# Patient Record
Sex: MALE | Race: OTHER | HISPANIC OR LATINO | Employment: FULL TIME | URBAN - METROPOLITAN AREA
[De-identification: names, ages, dates, MRNs, and addresses within clinical notes are randomized per-mention and may not be internally consistent; named-entity substitution may affect disease eponyms.]

---

## 2020-08-25 ENCOUNTER — APPOINTMENT (EMERGENCY)
Dept: RADIOLOGY | Facility: HOSPITAL | Age: 40
End: 2020-08-25
Payer: COMMERCIAL

## 2020-08-25 ENCOUNTER — HOSPITAL ENCOUNTER (EMERGENCY)
Facility: HOSPITAL | Age: 40
Discharge: HOME | End: 2020-08-26
Attending: EMERGENCY MEDICINE | Admitting: EMERGENCY MEDICINE
Payer: COMMERCIAL

## 2020-08-25 DIAGNOSIS — R07.89 ATYPICAL CHEST PAIN: Primary | ICD-10-CM

## 2020-08-25 LAB
BASOPHILS # BLD AUTO: 0 THOUSANDS/ΜL (ref 0–0.1)
BASOPHILS NFR BLD AUTO: 1 % (ref 0–1)
EOSINOPHIL # BLD AUTO: 0.2 THOUSAND/ΜL (ref 0–0.4)
EOSINOPHIL NFR BLD AUTO: 3 % (ref 0–6)
ERYTHROCYTE [DISTWIDTH] IN BLOOD BY AUTOMATED COUNT: 13.5 %
HCT VFR BLD AUTO: 43.8 % (ref 41–53)
HGB BLD-MCNC: 15.2 G/DL (ref 13.5–17.5)
LYMPHOCYTES # BLD AUTO: 1.9 THOUSANDS/ΜL (ref 0.5–4)
LYMPHOCYTES NFR BLD AUTO: 27 % (ref 25–45)
MCH RBC QN AUTO: 34.1 PG (ref 26–34)
MCHC RBC AUTO-ENTMCNC: 34.8 G/DL (ref 31–36)
MCV RBC AUTO: 98 FL (ref 80–100)
MONOCYTES # BLD AUTO: 0.7 THOUSAND/ΜL (ref 0.2–0.9)
MONOCYTES NFR BLD AUTO: 11 % (ref 1–10)
NEUTROPHILS # BLD AUTO: 4 THOUSANDS/ΜL (ref 1.8–7.8)
NEUTS SEG NFR BLD AUTO: 58 % (ref 45–65)
PLATELET # BLD AUTO: 216 THOUSANDS/UL (ref 150–450)
PMV BLD AUTO: 8.9 FL (ref 8.9–12.7)
RBC # BLD AUTO: 4.47 MILLION/UL (ref 4.5–5.9)
WBC # BLD AUTO: 6.8 THOUSAND/UL (ref 4.5–11)

## 2020-08-25 PROCEDURE — 96374 THER/PROPH/DIAG INJ IV PUSH: CPT

## 2020-08-25 PROCEDURE — 71045 X-RAY EXAM CHEST 1 VIEW: CPT

## 2020-08-25 PROCEDURE — 84484 ASSAY OF TROPONIN QUANT: CPT | Performed by: PHYSICIAN ASSISTANT

## 2020-08-25 PROCEDURE — 96361 HYDRATE IV INFUSION ADD-ON: CPT

## 2020-08-25 PROCEDURE — 80053 COMPREHEN METABOLIC PANEL: CPT | Performed by: PHYSICIAN ASSISTANT

## 2020-08-25 PROCEDURE — 99285 EMERGENCY DEPT VISIT HI MDM: CPT

## 2020-08-25 PROCEDURE — 93005 ELECTROCARDIOGRAM TRACING: CPT

## 2020-08-25 PROCEDURE — 36415 COLL VENOUS BLD VENIPUNCTURE: CPT | Performed by: PHYSICIAN ASSISTANT

## 2020-08-25 PROCEDURE — 99284 EMERGENCY DEPT VISIT MOD MDM: CPT | Performed by: PHYSICIAN ASSISTANT

## 2020-08-25 PROCEDURE — 85025 COMPLETE CBC W/AUTO DIFF WBC: CPT | Performed by: PHYSICIAN ASSISTANT

## 2020-08-25 PROCEDURE — 83690 ASSAY OF LIPASE: CPT | Performed by: PHYSICIAN ASSISTANT

## 2020-08-25 RX ORDER — KETOROLAC TROMETHAMINE 30 MG/ML
15 INJECTION, SOLUTION INTRAMUSCULAR; INTRAVENOUS ONCE
Status: DISCONTINUED | OUTPATIENT
Start: 2020-08-25 | End: 2020-08-25

## 2020-08-25 RX ORDER — KETOROLAC TROMETHAMINE 30 MG/ML
15 INJECTION, SOLUTION INTRAMUSCULAR; INTRAVENOUS ONCE
Status: COMPLETED | OUTPATIENT
Start: 2020-08-26 | End: 2020-08-25

## 2020-08-25 RX ADMIN — SODIUM CHLORIDE 1000 ML: 0.9 INJECTION, SOLUTION INTRAVENOUS at 23:45

## 2020-08-25 RX ADMIN — KETOROLAC TROMETHAMINE 15 MG: 30 INJECTION, SOLUTION INTRAMUSCULAR at 23:50

## 2020-08-26 VITALS
DIASTOLIC BLOOD PRESSURE: 64 MMHG | SYSTOLIC BLOOD PRESSURE: 112 MMHG | TEMPERATURE: 97.2 F | WEIGHT: 159.25 LBS | RESPIRATION RATE: 15 BRPM | HEART RATE: 61 BPM | OXYGEN SATURATION: 96 %

## 2020-08-26 LAB
ALBUMIN SERPL BCP-MCNC: 4.1 G/DL (ref 3–5.2)
ALP SERPL-CCNC: 53 U/L (ref 43–122)
ALT SERPL W P-5'-P-CCNC: 87 U/L (ref 9–52)
ANION GAP SERPL CALCULATED.3IONS-SCNC: 5 MMOL/L (ref 5–14)
AST SERPL W P-5'-P-CCNC: 74 U/L (ref 17–59)
ATRIAL RATE: 75 BPM
BILIRUB SERPL-MCNC: 0.3 MG/DL
BUN SERPL-MCNC: 11 MG/DL (ref 5–25)
CALCIUM SERPL-MCNC: 9.3 MG/DL (ref 8.4–10.2)
CHLORIDE SERPL-SCNC: 105 MMOL/L (ref 97–108)
CO2 SERPL-SCNC: 26 MMOL/L (ref 22–30)
CREAT SERPL-MCNC: 0.81 MG/DL (ref 0.7–1.5)
GFR SERPL CREATININE-BSD FRML MDRD: 110 ML/MIN/1.73SQ M
GLUCOSE SERPL-MCNC: 119 MG/DL (ref 70–99)
LIPASE SERPL-CCNC: 119 U/L (ref 23–300)
P AXIS: 29 DEGREES
POTASSIUM SERPL-SCNC: 3.5 MMOL/L (ref 3.6–5)
PR INTERVAL: 164 MS
PROT SERPL-MCNC: 7 G/DL (ref 5.9–8.4)
QRS AXIS: -43 DEGREES
QRSD INTERVAL: 96 MS
QT INTERVAL: 368 MS
QTC INTERVAL: 410 MS
SODIUM SERPL-SCNC: 136 MMOL/L (ref 137–147)
T WAVE AXIS: 23 DEGREES
TROPONIN I SERPL-MCNC: <0.01 NG/ML (ref 0–0.03)
TROPONIN I SERPL-MCNC: <0.01 NG/ML (ref 0–0.03)
VENTRICULAR RATE: 75 BPM

## 2020-08-26 PROCEDURE — 93010 ELECTROCARDIOGRAM REPORT: CPT | Performed by: INTERNAL MEDICINE

## 2020-08-26 PROCEDURE — 84484 ASSAY OF TROPONIN QUANT: CPT | Performed by: PHYSICIAN ASSISTANT

## 2020-08-26 PROCEDURE — 36415 COLL VENOUS BLD VENIPUNCTURE: CPT | Performed by: PHYSICIAN ASSISTANT

## 2020-08-26 NOTE — ED ATTENDING ATTESTATION
8/25/2020  I, [address] [facility], saw and evaluated the patient  I have discussed the patient with the resident/non-physician practitioner and agree with the resident's/non-physician practitioner's findings, Plan of Care, and MDM as documented in the resident's/non-physician practitioner's note, except where noted  All available labs and Radiology studies were reviewed  I was present for key portions of any procedure(s) performed by the resident/non-physician practitioner and I was immediately available to provide assistance  At this point I agree with the current assessment done in the Emergency Department         ED Course         Critical Care Time  Procedures

## 2020-08-26 NOTE — ED PROVIDER NOTES
History  Chief Complaint   Patient presents with    Chest Pain     Chest pain started 15 minutes prior to arrival, was watching television when it started  Patient presents for evaluation of a 15 minutes history of substernal and left-sided chest pain  Patient states he was sitting on his couch watching TV when pain suddenly started  Denies any medical history nor any family cardiac history  Does not take any medications regularly  Denies abdominal pain but does report some nausea  Denies history of similar occurrences in the past   Denies any numbness or tingling  Denies any shortness of breath  Denies any cough, fever, chills  Denies any tobacco use  No other symptoms or complaints  None       History reviewed  No pertinent past medical history  History reviewed  No pertinent surgical history  History reviewed  No pertinent family history  I have reviewed and agree with the history as documented  E-Cigarette/Vaping    E-Cigarette Use Never User      E-Cigarette/Vaping Substances     Social History     Tobacco Use    Smoking status: Light Tobacco Smoker    Smokeless tobacco: Never Used    Tobacco comment: socially   Substance Use Topics    Alcohol use: Yes     Frequency: 4 or more times a week     Drinks per session: 7 to 9     Binge frequency: Daily or almost daily    Drug use: Yes     Types: Marijuana       Review of Systems   Constitutional: Negative for chills and fever  HENT: Negative for congestion, ear pain and sore throat  Eyes: Negative for pain  Respiratory: Negative for cough and shortness of breath  Cardiovascular: Positive for chest pain  Gastrointestinal: Positive for nausea  Negative for abdominal pain and vomiting  Genitourinary: Negative for dysuria  Musculoskeletal: Negative for back pain  Skin: Negative for rash  Neurological: Negative for dizziness, weakness and numbness  Psychiatric/Behavioral: Negative for suicidal ideas     All other systems reviewed and are negative  Physical Exam  Physical Exam  Vitals signs reviewed  Constitutional:       General: He is not in acute distress  Appearance: He is well-developed  He is not diaphoretic  HENT:      Head: Normocephalic and atraumatic  Right Ear: External ear normal       Left Ear: External ear normal       Nose: Nose normal    Eyes:      Pupils: Pupils are equal, round, and reactive to light  Neck:      Musculoskeletal: Normal range of motion and neck supple  Cardiovascular:      Rate and Rhythm: Normal rate and regular rhythm  Heart sounds: Normal heart sounds  Heart sounds not distant  No murmur  Pulmonary:      Effort: Pulmonary effort is normal       Breath sounds: Normal breath sounds  No decreased breath sounds, wheezing, rhonchi or rales  Chest:      Chest wall: No tenderness or crepitus  Abdominal:      General: Bowel sounds are normal       Palpations: Abdomen is soft  Tenderness: There is no abdominal tenderness  Musculoskeletal: Normal range of motion  Skin:     General: Skin is warm and dry  Neurological:      Mental Status: He is alert and oriented to person, place, and time           Vital Signs  ED Triage Vitals [08/25/20 2312]   Temperature Pulse Respirations Blood Pressure SpO2   (!) 97 2 °F (36 2 °C) 95 20 133/83 97 %      Temp Source Heart Rate Source Patient Position - Orthostatic VS BP Location FiO2 (%)   Tympanic Monitor Lying Left arm --      Pain Score       8           Vitals:    08/26/20 0100 08/26/20 0130 08/26/20 0241 08/26/20 0300   BP: 119/72 119/73 116/76 120/73   Pulse: 69 67 58 59   Patient Position - Orthostatic VS:    Sitting         Visual Acuity      ED Medications  Medications   sodium chloride 0 9 % bolus 1,000 mL (0 mL Intravenous Stopped 8/26/20 0100)   ketorolac (TORADOL) injection 15 mg (15 mg Intravenous Given 8/25/20 2350)       Diagnostic Studies  Results Reviewed     Procedure Component Value Units Date/Time Troponin I [[id number]]  (Normal) Collected:  08/26/20 0241    Lab Status:  Final result Specimen:  Blood from Arm, Right Updated:  08/26/20 0341     Troponin I <0 01 ng/mL     Troponin I [[id number]]  (Normal) Collected:  08/25/20 2342    Lab Status:  Final result Specimen:  Blood from Arm, Right Updated:  08/26/20 0014     Troponin I <0 01 ng/mL     Lipase [[id number]]  (Normal) Collected:  08/25/20 2342    Lab Status:  Final result Specimen:  Blood from Arm, Right Updated:  08/26/20 0002     Lipase 119 u/L     Comprehensive metabolic panel [[id number]]  (Abnormal) Collected:  08/25/20 2342    Lab Status:  Final result Specimen:  Blood from Arm, Right Updated:  08/26/20 0001     Sodium 136 mmol/L      Potassium 3 5 mmol/L      Chloride 105 mmol/L      CO2 26 mmol/L      ANION GAP 5 mmol/L      BUN 11 mg/dL      Creatinine 0 81 mg/dL      Glucose 119 mg/dL      Calcium 9 3 mg/dL      AST 74 U/L      ALT 87 U/L      Alkaline Phosphatase 53 U/L      Total Protein 7 0 g/dL      Albumin 4 1 g/dL      Total Bilirubin 0 30 mg/dL      eGFR 110 ml/min/1 73sq m     Narrative:       [place] guidelines for Chronic Kidney Disease (CKD):     Stage 1 with normal or high GFR (GFR > 90 mL/min/1 73 square meters)    Stage 2 Mild CKD (GFR = 60-89 mL/min/1 73 square meters)    Stage 3A Moderate CKD (GFR = 45-59 mL/min/1 73 square meters)    Stage 3B Moderate CKD (GFR = 30-44 mL/min/1 73 square meters)    Stage 4 Severe CKD (GFR = 15-29 mL/min/1 73 square meters)    Stage 5 End Stage CKD (GFR <15 mL/min/1 73 square meters)  Note: GFR calculation is accurate only with a steady state creatinine    CBC and differential [[id number]]  (Abnormal) Collected:  08/25/20 2342    Lab Status:  Final result Specimen:  Blood from Arm, Right Updated:  08/25/20 4432     WBC 6 80 Thousand/uL      RBC 4 47 Million/uL      Hemoglobin 15 2 g/dL      Hematocrit 43 8 %      MCV 98 fL      MCH 34 1 pg      MCHC 34 8 g/dL      RDW 13 5 %      MPV 8 9 fL      Platelets 497 Thousands/uL      Neutrophils Relative 58 %      Lymphocytes Relative 27 %      Monocytes Relative 11 %      Eosinophils Relative 3 %      Basophils Relative 1 %      Neutrophils Absolute 4 00 Thousands/µL      Lymphocytes Absolute 1 90 Thousands/µL      Monocytes Absolute 0 70 Thousand/µL      Eosinophils Absolute 0 20 Thousand/µL      Basophils Absolute 0 00 Thousands/µL                  XR chest portable    (Results Pending)              Procedures  ECG 12 Lead Documentation Only    Date/Time: 8/25/2020 11:24 PM  Performed by: Kecia Ramirez PA-C  Authorized by: Kecia Ramirez PA-C     Indications / Diagnosis:  Chest pain  Patient location:  ED  Previous ECG:     Previous ECG:  Unavailable    Comparison to cardiac monitor: Yes    Interpretation:     Interpretation: normal    Rate:     ECG rate:  75    ECG rate assessment: normal    Rhythm:     Rhythm: sinus rhythm    Ectopy:     Ectopy: none    QRS:     QRS axis:  Normal    QRS intervals:  Normal  Conduction:     Conduction: normal    ST segments:     ST segments:  Normal  T waves:     T waves: normal               ED Course  ED Course as of Aug 26 0352   Wed Aug 26, 2020   0023 Chest pain resolved  Initial troponin negative  Will repeat at 0242          US AUDIT      Most Recent Value   Initial Alcohol Screen: US AUDIT-C    1  How often do you have a drink containing alcohol? 6 Filed at: 08/25/2020 2320   2  How many drinks containing alcohol do you have on a typical day you are drinking? 6 Filed at: 08/25/2020 2320   3a  Male UNDER 65: How often do you have five or more drinks on one occasion? 6 Filed at: 08/25/2020 2320   Audit-C Score  (!) 18 Filed at: 08/25/2020 2320   Full Alcohol Screen: US AUDIT   4  How often during the last year have you found that you were not able to stop drinking once you had started? 0 Filed at: 08/25/2020 2320   5   How often during past year have you failed to do what was normally expected of you because of drinking? 0 Filed at: 08/25/2020 2320   6  How often in past year have you needed a first drink in the morning to get yourself going after a heavy drinking session? 0 Filed at: 08/25/2020 2320   7  How often in past year have you had feeling of guilt or remorse after drinking? 0 Filed at: 08/25/2020 2320   8  How often in past year have you been unable to remember what happened night before because you had been drinking? 0 Filed at: 08/25/2020 2320   9  Have you or someone else been injured as a result of your drinking? 0 Filed at: 08/25/2020 2320   10  Has a relative, friend, doctor or other health worker been concerned about your drinking and suggested you cut down?   0 Filed at: 08/25/2020 2320   AUDIT Total Score  (!) 18 Filed at: 08/25/2020 2320            HEART Risk Score      Most Recent Value   Heart Score Risk Calculator   History  0 Filed at: 08/26/2020 0024   ECG  0 Filed at: 08/26/2020 0024   Age  0 Filed at: 08/26/2020 0024   Risk Factors  0 Filed at: 08/26/2020 0024   Troponin  0 Filed at: 08/26/2020 0024   HEART Score  0 Filed at: 08/26/2020 0024            SHIMON/DAST-10      Most Recent Value   How many times in the past year have you    Used an illegal drug or used a prescription medication for non-medical reasons? Never Filed at: 08/25/2020 2322                                MDM  Number of Diagnoses or Management Options  Atypical chest pain:   Diagnosis management comments: Repeat troponin negative  EKG normal   Lab work otherwise normal   Instructed to follow up with Cardiology  Heart score of 0  Given referral   Stable for discharge    Patient verbalizes understanding and agrees with plan  The management plan was discussed in detail with the patient at bedside and all questions were answered  Prior to discharge, I provided both verbal and written instructions   I discussed with the patient the signs and symptoms for which to return to the emergency department  All questions were answered and patient was comfortable with the plan of care and discharged to home  The patient agrees to return to the Emergency Department for concerns and/or progression of illness  Disposition  Final diagnoses:   Atypical chest pain     Time reflects when diagnosis was documented in both MDM as applicable and the Disposition within this note     Time User Action Codes Description Comment    8/26/2020  2:14 AM Robin Ruiz Add [R07 89] Atypical chest pain       ED Disposition     ED Disposition Condition Date/Time Comment    Discharge Stable Wed Aug 26, 2020  2:14 AM Nuris Lorenzo discharge to home/self care  Follow-up Information     Follow up With Specialties Details Why Contact Info    Marya Arzate MD    [address]  [facility] Elver Gannon MD Cardiology, Internal Medicine   [address] [facility]  [address] [facility]  [phone]            Patient's Medications    No medications on file     No discharge procedures on file      PDMP Review     None          ED Provider  Electronically Signed by           Martha Botello PA-C  08/26/20 2884

## 2020-08-26 NOTE — DISCHARGE INSTRUCTIONS
Please follow up with cardiology  I have provided you with a referral  Please return to the ER with any worsening symptoms

## 2020-11-20 ENCOUNTER — HOSPITAL ENCOUNTER (EMERGENCY)
Facility: HOSPITAL | Age: 40
Discharge: HOME | End: 2020-11-20
Attending: EMERGENCY MEDICINE | Admitting: EMERGENCY MEDICINE
Payer: COMMERCIAL

## 2020-11-20 VITALS
RESPIRATION RATE: 17 BRPM | OXYGEN SATURATION: 97 % | HEART RATE: 95 BPM | DIASTOLIC BLOOD PRESSURE: 92 MMHG | TEMPERATURE: 99.2 F | WEIGHT: 160 LBS | SYSTOLIC BLOOD PRESSURE: 138 MMHG

## 2020-11-20 DIAGNOSIS — S61.219A FINGER LACERATION: Primary | ICD-10-CM

## 2020-11-20 PROCEDURE — 12001 RPR S/N/AX/GEN/TRNK 2.5CM/<: CPT | Performed by: PHYSICIAN ASSISTANT

## 2020-11-20 PROCEDURE — 99282 EMERGENCY DEPT VISIT SF MDM: CPT | Performed by: PHYSICIAN ASSISTANT

## 2020-11-20 PROCEDURE — 99283 EMERGENCY DEPT VISIT LOW MDM: CPT

## 2020-11-20 RX ORDER — LIDOCAINE HYDROCHLORIDE AND EPINEPHRINE 10; 10 MG/ML; UG/ML
10 INJECTION, SOLUTION INFILTRATION; PERINEURAL ONCE
Status: COMPLETED | OUTPATIENT
Start: 2020-11-20 | End: 2020-11-20

## 2020-11-20 RX ADMIN — LIDOCAINE HYDROCHLORIDE AND EPINEPHRINE 10 ML: 10; 10 INJECTION, SOLUTION INFILTRATION; PERINEURAL at 21:49

## 2022-02-08 ENCOUNTER — HOSPITAL ENCOUNTER (EMERGENCY)
Facility: HOSPITAL | Age: 42
Discharge: HOME | End: 2022-02-08
Attending: EMERGENCY MEDICINE | Admitting: EMERGENCY MEDICINE
Payer: COMMERCIAL

## 2022-02-08 VITALS
OXYGEN SATURATION: 98 % | SYSTOLIC BLOOD PRESSURE: 131 MMHG | WEIGHT: 176.4 LBS | RESPIRATION RATE: 18 BRPM | TEMPERATURE: 99.4 F | HEART RATE: 100 BPM | DIASTOLIC BLOOD PRESSURE: 92 MMHG

## 2022-02-08 DIAGNOSIS — L30.9 ECZEMA, UNSPECIFIED TYPE: Primary | ICD-10-CM

## 2022-02-08 PROCEDURE — 99282 EMERGENCY DEPT VISIT SF MDM: CPT

## 2022-02-08 PROCEDURE — 99282 EMERGENCY DEPT VISIT SF MDM: CPT | Performed by: EMERGENCY MEDICINE

## 2022-02-08 RX ORDER — DIAPER,BRIEF,INFANT-TODD,DISP
EACH MISCELLANEOUS
Qty: 45 G | Refills: 0 | Status: SHIPPED | OUTPATIENT
Start: 2022-02-08

## 2022-02-08 RX ORDER — CALAMINE
LOTION (ML) TOPICAL AS NEEDED
Qty: 120 ML | Refills: 0 | Status: SHIPPED | OUTPATIENT
Start: 2022-02-08

## 2022-02-08 NOTE — ED PROVIDER NOTES
History  Chief Complaint   Patient presents with    Rash     pt c/o recurring "eczema" needs a refill on medication     40-year-old male with no medical problems present emergency room with right low back rash  Doses this been going on for over 2 years, was treated in [place] for this and it resolved after ointment that he applied  But then it has returned over the past few months  Itchy  No drainage  No pain  History provided by:  Patient  Rash  Location: Low back and right buttock  Quality: itchiness    Severity:  Mild  Onset quality:  Gradual  Duration:  3 months  Timing:  Constant  Progression:  Spreading  Chronicity:  New  Relieved by:  Nothing  Worsened by:  Nothing  Ineffective treatments:  None tried  Associated symptoms: no abdominal pain, no fever, no joint pain, no shortness of breath, no sore throat and not vomiting        Prior to Admission Medications   Prescriptions Last Dose Informant Patient Reported? Taking?   bacitracin topical ointment 500 units/g topical ointment   No No   Sig: Apply 1 large application topically 2 (two) times a day      Facility-Administered Medications: None       History reviewed  No pertinent past medical history  History reviewed  No pertinent surgical history  History reviewed  No pertinent family history  I have reviewed and agree with the history as documented  E-Cigarette/Vaping    E-Cigarette Use Never User      E-Cigarette/Vaping Substances     Social History     Tobacco Use    Smoking status: Light Tobacco Smoker    Smokeless tobacco: Never Used    Tobacco comment: socially   Vaping Use    Vaping Use: Never used   Substance Use Topics    Alcohol use: Yes    Drug use: Yes     Types: Marijuana     Comment: K2       Review of Systems   Constitutional: Negative for chills and fever  HENT: Negative for ear pain and sore throat  Eyes: Negative for pain and visual disturbance  Respiratory: Negative for cough and shortness of breath  Cardiovascular: Negative for chest pain and palpitations  Gastrointestinal: Negative for abdominal pain and vomiting  Genitourinary: Negative for dysuria and hematuria  Musculoskeletal: Negative for arthralgias and back pain  Skin: Positive for rash  Negative for color change  Neurological: Negative for seizures and syncope  All other systems reviewed and are negative  Physical Exam  Physical Exam  Vitals and nursing note reviewed  Constitutional:       Appearance: He is well-developed  HENT:      Head: Normocephalic and atraumatic  Eyes:      Conjunctiva/sclera: Conjunctivae normal    Cardiovascular:      Rate and Rhythm: Normal rate and regular rhythm  Heart sounds: No murmur heard  Pulmonary:      Effort: Pulmonary effort is normal  No respiratory distress  Breath sounds: Normal breath sounds  Abdominal:      Palpations: Abdomen is soft  Tenderness: There is no abdominal tenderness  Musculoskeletal:      Cervical back: Neck supple  Skin:     General: Skin is warm and dry  Comments: Low back scaly eczematous rash spreading to the right buttock and right posterior thigh no erythema and no induration   Neurological:      Mental Status: He is alert           Vital Signs  ED Triage Vitals [02/08/22 1431]   Temperature Pulse Respirations Blood Pressure SpO2   99 4 °F (37 4 °C) 100 18 131/92 98 %      Temp Source Heart Rate Source Patient Position - Orthostatic VS BP Location FiO2 (%)   Oral Monitor Sitting Left arm --      Pain Score       --           Vitals:    02/08/22 1431   BP: 131/92   Pulse: 100   Patient Position - Orthostatic VS: Sitting         Visual Acuity      ED Medications  Medications - No data to display    Diagnostic Studies  Results Reviewed     None                 No orders to display              Procedures  Procedures         ED Course                                             MDM  Number of Diagnoses or Management Options  Eczema, unspecified type: new and does not require workup  Diagnosis management comments: Likely eczema, will treat with calamine lotion and hydrocortisone  PCP follow-up  Disposition  Final diagnoses:   Eczema, unspecified type     Time reflects when diagnosis was documented in both MDM as applicable and the Disposition within this note     Time User Action Codes Description Comment    2/8/2022  2:43 PM Tanya Angela Add [L30 9] Eczema, unspecified type       ED Disposition     ED Disposition Condition Date/Time Comment    Discharge Stable Tue Feb 8, 2022  2:43 PM Elizabeth Artis discharge to home/self care  Follow-up Information     Follow up With Specialties Details Why Brenna Roman MD  In 1 week  [address]  [phone]            Discharge Medication List as of 2/8/2022  2:48 PM      START taking these medications    Details   calamine lotion Apply topically as needed for itching, Starting Tue 2/8/2022, Print      hydrocortisone 1 % cream Apply to affected area 2 times daily, Print         CONTINUE these medications which have NOT CHANGED    Details   bacitracin topical ointment 500 units/g topical ointment Apply 1 large application topically 2 (two) times a day, Starting Mon 12/7/2020, Print             No discharge procedures on file      PDMP Review     None          ED Provider  Electronically Signed by           Arlin Brantley MD  02/08/22 4608

## 2022-03-07 ENCOUNTER — HOSPITAL ENCOUNTER (EMERGENCY)
Facility: HOSPITAL | Age: 42
Discharge: HOME | End: 2022-03-07
Attending: EMERGENCY MEDICINE | Admitting: EMERGENCY MEDICINE
Payer: COMMERCIAL

## 2022-03-07 VITALS
RESPIRATION RATE: 20 BRPM | OXYGEN SATURATION: 97 % | SYSTOLIC BLOOD PRESSURE: 120 MMHG | WEIGHT: 182.6 LBS | HEART RATE: 109 BPM | DIASTOLIC BLOOD PRESSURE: 79 MMHG | TEMPERATURE: 98.8 F

## 2022-03-07 DIAGNOSIS — M77.12 LATERAL EPICONDYLITIS OF LEFT ELBOW: Primary | ICD-10-CM

## 2022-03-07 PROCEDURE — 99284 EMERGENCY DEPT VISIT MOD MDM: CPT | Performed by: EMERGENCY MEDICINE

## 2022-03-07 PROCEDURE — 99283 EMERGENCY DEPT VISIT LOW MDM: CPT

## 2022-03-07 RX ORDER — NAPROXEN 500 MG/1
500 TABLET ORAL 2 TIMES DAILY WITH MEALS
Qty: 30 TABLET | Refills: 0 | Status: SHIPPED | OUTPATIENT
Start: 2022-03-07

## 2022-03-07 NOTE — ED PROVIDER NOTES
History  Chief Complaint   Patient presents with    Elbow Pain     L elbow pain for several weeks; pt does not recall injury/trauma     42-year-old male presents emergency room with left elbow pain for 7 months  Notes the lateral elbow  It is a lot of boxes at work  No nausea vomiting chest pain shortness of breath  No numbness tingling  History provided by:  Patient  Elbow Pain  Location:  Elbow  Elbow location:  L elbow  Injury: no    Pain details:     Quality:  Aching    Radiates to:  Does not radiate    Severity:  Moderate    Onset quality:  Gradual    Duration:  7 months    Timing:  Constant    Progression:  Unchanged  Associated symptoms: no back pain and no fever        Prior to Admission Medications   Prescriptions Last Dose Informant Patient Reported? Taking?   bacitracin topical ointment 500 units/g topical ointment   No No   Sig: Apply 1 large application topically 2 (two) times a day   calamine lotion   No No   Sig: Apply topically as needed for itching   hydrocortisone 1 % cream   No No   Sig: Apply to affected area 2 times daily      Facility-Administered Medications: None       History reviewed  No pertinent past medical history  History reviewed  No pertinent surgical history  History reviewed  No pertinent family history  I have reviewed and agree with the history as documented  E-Cigarette/Vaping    E-Cigarette Use Never User      E-Cigarette/Vaping Substances     Social History     Tobacco Use    Smoking status: Current Every Day Smoker     Packs/day: 0 50     Types: Cigarettes    Smokeless tobacco: Never Used    Tobacco comment: socially   Vaping Use    Vaping Use: Never used   Substance Use Topics    Alcohol use: Yes     Comment: occasionally     Drug use: Not Currently     Types: Marijuana     Comment: K2       Review of Systems   Constitutional: Negative for chills and fever  HENT: Negative for ear pain and sore throat      Eyes: Negative for pain and visual disturbance  Respiratory: Negative for cough and shortness of breath  Cardiovascular: Negative for chest pain and palpitations  Gastrointestinal: Negative for abdominal pain and vomiting  Genitourinary: Negative for dysuria and hematuria  Musculoskeletal: Negative for arthralgias and back pain  Skin: Negative for color change and rash  Neurological: Negative for seizures and syncope  All other systems reviewed and are negative  Physical Exam  Physical Exam  Vitals and nursing note reviewed  Constitutional:       Appearance: He is well-developed  HENT:      Head: Normocephalic and atraumatic  Eyes:      Conjunctiva/sclera: Conjunctivae normal    Cardiovascular:      Rate and Rhythm: Normal rate and regular rhythm  Heart sounds: No murmur heard  Pulmonary:      Effort: Pulmonary effort is normal  No respiratory distress  Breath sounds: Normal breath sounds  Abdominal:      Palpations: Abdomen is soft  Tenderness: There is no abdominal tenderness  Musculoskeletal:      Cervical back: Neck supple  Comments: Left elbow with lateral epicondylar tenderness palpation  Full range of motion  No erythema  No effusion  Skin:     General: Skin is warm and dry  Capillary Refill: Capillary refill takes less than 2 seconds  Neurological:      Mental Status: He is alert           Vital Signs  ED Triage Vitals [03/07/22 1438]   Temperature Pulse Respirations Blood Pressure SpO2   98 8 °F (37 1 °C) (!) 109 20 120/79 97 %      Temp Source Heart Rate Source Patient Position - Orthostatic VS BP Location FiO2 (%)   Tympanic Monitor Sitting Left arm --      Pain Score       --           Vitals:    03/07/22 1438   BP: 120/79   Pulse: (!) 109   Patient Position - Orthostatic VS: Sitting         Visual Acuity      ED Medications  Medications - No data to display    Diagnostic Studies  Results Reviewed     None                 No orders to display Procedures  Procedures         ED Course                                             MDM  Number of Diagnoses or Management Options  Lateral epicondylitis of left elbow  Diagnosis management comments: Likely tennis elbow, instructed on purchasing a brace for this  NSAIDs  Disposition  Final diagnoses:   Lateral epicondylitis of left elbow     Time reflects when diagnosis was documented in both MDM as applicable and the Disposition within this note     Time User Action Codes Description Comment    3/7/2022  3:15 PM Yaakov Daniel Barahona [P73 90] Lateral epicondylitis of left elbow       ED Disposition     ED Disposition Condition Date/Time Comment    Discharge Stable Mon Mar 7, 2022  3:07 PM Nuvia Dee discharge to home/self care  Follow-up Information     Follow up With Specialties Details Why Contact Info Additional Information     [address] [facility] In 2 days  [address] [facility] [address] [phone] [address], [address] [facility], [address], [place], [address] [phone]          Patient's Medications   Discharge Prescriptions    NAPROXEN (NAPROSYN) 500 MG TABLET    Take 1 tablet (500 mg total) by mouth 2 (two) times a day with meals       Start Date: 3/7/2022  End Date: --       Order Dose: 500 mg       Quantity: 30 tablet    Refills: 0       No discharge procedures on file      PDMP Review     None          ED Provider  Electronically Signed by           Rachel Lynch MD  03/07/22 [phone]

## 2023-06-02 ENCOUNTER — APPOINTMENT (EMERGENCY)
Dept: RADIOLOGY | Facility: HOSPITAL | Age: 43
End: 2023-06-02
Payer: COMMERCIAL

## 2023-06-02 ENCOUNTER — HOSPITAL ENCOUNTER (EMERGENCY)
Facility: HOSPITAL | Age: 43
Discharge: HOME | End: 2023-06-02
Attending: EMERGENCY MEDICINE
Payer: COMMERCIAL

## 2023-06-02 VITALS
HEART RATE: 69 BPM | SYSTOLIC BLOOD PRESSURE: 143 MMHG | DIASTOLIC BLOOD PRESSURE: 98 MMHG | TEMPERATURE: 97.6 F | RESPIRATION RATE: 20 BRPM | OXYGEN SATURATION: 98 % | WEIGHT: 152.7 LBS

## 2023-06-02 DIAGNOSIS — L03.119 CELLULITIS OF FOOT: Primary | ICD-10-CM

## 2023-06-02 LAB
ALBUMIN SERPL BCP-MCNC: 4.3 G/DL (ref 3.5–5)
ALP SERPL-CCNC: 71 U/L (ref 34–104)
ALT SERPL W P-5'-P-CCNC: 40 U/L (ref 7–52)
ANION GAP SERPL CALCULATED.3IONS-SCNC: 12 MMOL/L (ref 4–13)
AST SERPL W P-5'-P-CCNC: 44 U/L (ref 13–39)
BASOPHILS # BLD AUTO: 0.02 THOUSANDS/ÂΜL (ref 0–0.1)
BASOPHILS NFR BLD AUTO: 0 % (ref 0–1)
BILIRUB SERPL-MCNC: 0.57 MG/DL (ref 0.2–1)
BUN SERPL-MCNC: 5 MG/DL (ref 5–25)
CALCIUM SERPL-MCNC: 9.1 MG/DL (ref 8.4–10.2)
CHLORIDE SERPL-SCNC: 103 MMOL/L (ref 96–108)
CO2 SERPL-SCNC: 23 MMOL/L (ref 21–32)
CREAT SERPL-MCNC: 0.82 MG/DL (ref 0.6–1.3)
CRP SERPL QL: 13.5 MG/L
EOSINOPHIL # BLD AUTO: 0.25 THOUSAND/ÂΜL (ref 0–0.61)
EOSINOPHIL NFR BLD AUTO: 2 % (ref 0–6)
ERYTHROCYTE [DISTWIDTH] IN BLOOD BY AUTOMATED COUNT: 12.5 % (ref 11.6–15.1)
ERYTHROCYTE [SEDIMENTATION RATE] IN BLOOD: 14 MM/HOUR (ref 0–14)
GFR SERPL CREATININE-BSD FRML MDRD: 107 ML/MIN/1.73SQ M
GLUCOSE SERPL-MCNC: 137 MG/DL (ref 65–140)
HCT VFR BLD AUTO: 45.5 % (ref 36.5–49.3)
HGB BLD-MCNC: 15.5 G/DL (ref 12–17)
IMM GRANULOCYTES # BLD AUTO: 0.04 THOUSAND/UL (ref 0–0.2)
IMM GRANULOCYTES NFR BLD AUTO: 0 % (ref 0–2)
LYMPHOCYTES # BLD AUTO: 1.3 THOUSANDS/ÂΜL (ref 0.6–4.47)
LYMPHOCYTES NFR BLD AUTO: 13 % (ref 14–44)
MAGNESIUM SERPL-MCNC: 2 MG/DL (ref 1.9–2.7)
MCH RBC QN AUTO: 32.2 PG (ref 26.8–34.3)
MCHC RBC AUTO-ENTMCNC: 34.1 G/DL (ref 31.4–37.4)
MCV RBC AUTO: 94 FL (ref 82–98)
MONOCYTES # BLD AUTO: 1.17 THOUSAND/ÂΜL (ref 0.17–1.22)
MONOCYTES NFR BLD AUTO: 11 % (ref 4–12)
NEUTROPHILS # BLD AUTO: 7.59 THOUSANDS/ÂΜL (ref 1.85–7.62)
NEUTS SEG NFR BLD AUTO: 74 % (ref 43–75)
NRBC BLD AUTO-RTO: 0 /100 WBCS
PLATELET # BLD AUTO: 272 THOUSANDS/UL (ref 149–390)
PMV BLD AUTO: 9.7 FL (ref 8.9–12.7)
POTASSIUM SERPL-SCNC: 3 MMOL/L (ref 3.5–5.3)
PROT SERPL-MCNC: 7.3 G/DL (ref 6.4–8.4)
RBC # BLD AUTO: 4.82 MILLION/UL (ref 3.88–5.62)
SODIUM SERPL-SCNC: 138 MMOL/L (ref 135–147)
WBC # BLD AUTO: 10.37 THOUSAND/UL (ref 4.31–10.16)

## 2023-06-02 PROCEDURE — 73630 X-RAY EXAM OF FOOT: CPT

## 2023-06-02 PROCEDURE — 36415 COLL VENOUS BLD VENIPUNCTURE: CPT | Performed by: EMERGENCY MEDICINE

## 2023-06-02 PROCEDURE — 96375 TX/PRO/DX INJ NEW DRUG ADDON: CPT

## 2023-06-02 PROCEDURE — 85025 COMPLETE CBC W/AUTO DIFF WBC: CPT | Performed by: EMERGENCY MEDICINE

## 2023-06-02 PROCEDURE — 96361 HYDRATE IV INFUSION ADD-ON: CPT

## 2023-06-02 PROCEDURE — 99283 EMERGENCY DEPT VISIT LOW MDM: CPT

## 2023-06-02 PROCEDURE — 85652 RBC SED RATE AUTOMATED: CPT | Performed by: EMERGENCY MEDICINE

## 2023-06-02 PROCEDURE — 83735 ASSAY OF MAGNESIUM: CPT | Performed by: EMERGENCY MEDICINE

## 2023-06-02 PROCEDURE — 86140 C-REACTIVE PROTEIN: CPT | Performed by: EMERGENCY MEDICINE

## 2023-06-02 PROCEDURE — 80053 COMPREHEN METABOLIC PANEL: CPT | Performed by: EMERGENCY MEDICINE

## 2023-06-02 PROCEDURE — 96365 THER/PROPH/DIAG IV INF INIT: CPT

## 2023-06-02 PROCEDURE — 87040 BLOOD CULTURE FOR BACTERIA: CPT | Performed by: EMERGENCY MEDICINE

## 2023-06-02 RX ORDER — OXYCODONE HYDROCHLORIDE AND ACETAMINOPHEN 5; 325 MG/1; MG/1
1 TABLET ORAL EVERY 4 HOURS PRN
Qty: 12 TABLET | Refills: 0 | Status: SHIPPED | OUTPATIENT
Start: 2023-06-02 | End: 2023-06-05

## 2023-06-02 RX ORDER — CEFTRIAXONE 1 G/50ML
1000 INJECTION, SOLUTION INTRAVENOUS ONCE
Status: COMPLETED | OUTPATIENT
Start: 2023-06-02 | End: 2023-06-02

## 2023-06-02 RX ORDER — CLINDAMYCIN HYDROCHLORIDE 300 MG/1
300 CAPSULE ORAL 3 TIMES DAILY
Qty: 21 CAPSULE | Refills: 0 | Status: SHIPPED | OUTPATIENT
Start: 2023-06-02 | End: 2023-06-09

## 2023-06-02 RX ORDER — KETOROLAC TROMETHAMINE 30 MG/ML
15 INJECTION, SOLUTION INTRAMUSCULAR; INTRAVENOUS ONCE
Status: COMPLETED | OUTPATIENT
Start: 2023-06-02 | End: 2023-06-02

## 2023-06-02 RX ADMIN — KETOROLAC TROMETHAMINE 15 MG: 30 INJECTION, SOLUTION INTRAMUSCULAR; INTRAVENOUS at 12:54

## 2023-06-02 RX ADMIN — CEFTRIAXONE 1000 MG: 1 INJECTION, SOLUTION INTRAVENOUS at 13:37

## 2023-06-02 RX ADMIN — SODIUM CHLORIDE 1000 ML: 0.9 INJECTION, SOLUTION INTRAVENOUS at 12:08

## 2023-06-02 NOTE — Clinical Note
Hunter Keen was seen and treated in our emergency department on 6/2/2023  Diagnosis:     Garryyelena Ramu    He may return on this date: 06/05/2023         If you have any questions or concerns, please don't hesitate to call        Patricia Alonzo, DO    ______________________________           _______________          _______________  Hospital Representative                              Date                                Time

## 2023-06-02 NOTE — ED PROVIDER NOTES
History  Chief Complaint   Patient presents with   • Foot Pain     Pt reports the skin on his feet was dry  Pt reports that he now has a cut on the bottom of his foot on fifth toe of the left foot  No drainage present  Pt reports that it started three days ago  40-year-old male who arrives by walk-in, presenting for left foot pain  Patient states the his feet have been wet, not dry as noted in the triage, noticed that the skin split on the bottom, has not stepped on anything or had a penetrating issue before with his foot  Now pain between the second and third toes at the base  Mild redness, no fevers or chills  Pain worse with ambulating  No discharge noted  Patient denies history of diabetes  No documented history of IV drug abuse  Prior to Admission Medications   Prescriptions Last Dose Informant Patient Reported? Taking?   bacitracin topical ointment 500 units/g topical ointment   No No   Sig: Apply 1 large application topically 2 (two) times a day   calamine lotion   No No   Sig: Apply topically as needed for itching   hydrocortisone 1 % cream   No No   Sig: Apply to affected area 2 times daily   naproxen (Naprosyn) 500 mg tablet   No No   Sig: Take 1 tablet (500 mg total) by mouth 2 (two) times a day with meals      Facility-Administered Medications: None       History reviewed  No pertinent past medical history  History reviewed  No pertinent surgical history  History reviewed  No pertinent family history  I have reviewed and agree with the history as documented  E-Cigarette/Vaping   • E-Cigarette Use Never User      E-Cigarette/Vaping Substances     Social History     Tobacco Use   • Smoking status: Every Day     Packs/day: 1 00     Types: Cigarettes   • Smokeless tobacco: Never   • Tobacco comments:     socially   Vaping Use   • Vaping Use: Never used   Substance Use Topics   • Alcohol use:  Yes     Alcohol/week: 21 0 standard drinks of alcohol     Types: 21 Cans of beer per "week     Comment: occasionally    • Drug use: Not Currently     Types: Marijuana, Cocaine, \"Crack\" cocaine     Comment: K2       Review of Systems   Constitutional: Negative  Negative for chills and fever  HENT: Negative  Negative for rhinorrhea, sore throat, trouble swallowing and voice change  Eyes: Negative  Negative for pain and visual disturbance  Respiratory: Negative  Negative for cough, shortness of breath and wheezing  Cardiovascular: Negative  Negative for chest pain and palpitations  Gastrointestinal: Negative for abdominal pain, diarrhea, nausea and vomiting  Genitourinary: Negative  Negative for dysuria and frequency  Musculoskeletal: Negative  Negative for neck pain and neck stiffness  Skin: Negative  Negative for rash  Neurological: Negative  Negative for dizziness, speech difficulty, weakness, light-headedness and numbness  Physical Exam  Physical Exam  Vitals and nursing note reviewed  Constitutional:       General: He is not in acute distress  Appearance: He is well-developed  HENT:      Head: Normocephalic and atraumatic  Eyes:      Conjunctiva/sclera: Conjunctivae normal       Pupils: Pupils are equal, round, and reactive to light  Neck:      Trachea: No tracheal deviation  Cardiovascular:      Rate and Rhythm: Normal rate and regular rhythm  Pulmonary:      Effort: Pulmonary effort is normal  No respiratory distress  Breath sounds: Normal breath sounds  No wheezing or rales  Abdominal:      General: Bowel sounds are normal  There is no distension  Palpations: Abdomen is soft  Tenderness: There is no abdominal tenderness  There is no guarding or rebound  Musculoskeletal:         General: No tenderness or deformity  Normal range of motion  Cervical back: Normal range of motion and neck supple  Feet:    Skin:     General: Skin is warm and dry  Capillary Refill: Capillary refill takes less than 2 seconds        " Findings: No rash  Neurological:      Mental Status: He is alert and oriented to person, place, and time     Psychiatric:         Behavior: Behavior normal          Vital Signs  ED Triage Vitals   Temperature Pulse Respirations Blood Pressure SpO2   06/02/23 1139 06/02/23 1139 06/02/23 1139 06/02/23 1139 06/02/23 1139   97 6 °F (36 4 °C) 72 20 (!) 149/108 98 %      Temp Source Heart Rate Source Patient Position - Orthostatic VS BP Location FiO2 (%)   06/02/23 1139 06/02/23 1139 06/02/23 1139 06/02/23 1139 --   Tympanic Monitor Sitting Left arm       Pain Score       06/02/23 1137       10 - Worst Possible Pain           Vitals:    06/02/23 1212 06/02/23 1252 06/02/23 1335 06/02/23 1407   BP: (!) 140/101 (!) 156/103 157/96 143/98   Pulse: 67 68 62 69   Patient Position - Orthostatic VS: Lying Lying Lying Lying         Visual Acuity      ED Medications  Medications   sodium chloride 0 9 % bolus 1,000 mL (0 mL Intravenous Stopped 6/2/23 1333)   ketorolac (TORADOL) injection 15 mg (15 mg Intravenous Given 6/2/23 1254)   cefTRIAXone (ROCEPHIN) IVPB (premix in dextrose) 1,000 mg 50 mL (0 mg Intravenous Stopped 6/2/23 1408)       Diagnostic Studies  Results Reviewed     Procedure Component Value Units Date/Time    Sedimentation rate, automated [[id number]]  (Normal) Collected: 06/02/23 1206    Lab Status: Final result Specimen: Blood from Arm, Right Updated: 06/02/23 1251     Sed Rate 14 mm/hour     Comprehensive metabolic panel [[id number]]  (Abnormal) Collected: 06/02/23 1206    Lab Status: Final result Specimen: Blood from Arm, Right Updated: 06/02/23 1237     Sodium 138 mmol/L      Potassium 3 0 mmol/L      Chloride 103 mmol/L      CO2 23 mmol/L      ANION GAP 12 mmol/L      BUN 5 mg/dL      Creatinine 0 82 mg/dL      Glucose 137 mg/dL      Calcium 9 1 mg/dL      AST 44 U/L      ALT 40 U/L      Alkaline Phosphatase 71 U/L      Total Protein 7 3 g/dL      Albumin 4 3 g/dL      Total Bilirubin 0 57 mg/dL      eGFR 107 ml/min/1 73sq m     Narrative:      [employer] guidelines for Chronic Kidney Disease (CKD):   •  Stage 1 with normal or high GFR (GFR > 90 mL/min/1 73 square meters)  •  Stage 2 Mild CKD (GFR = 60-89 mL/min/1 73 square meters)  •  Stage 3A Moderate CKD (GFR = 45-59 mL/min/1 73 square meters)  •  Stage 3B Moderate CKD (GFR = 30-44 mL/min/1 73 square meters)  •  Stage 4 Severe CKD (GFR = 15-29 mL/min/1 73 square meters)  •  Stage 5 End Stage CKD (GFR <15 mL/min/1 73 square meters)  Note: GFR calculation is accurate only with a steady state creatinine    Magnesium [[id number]]  (Normal) Collected: 06/02/23 1206    Lab Status: Final result Specimen: Blood from Arm, Right Updated: 06/02/23 1237     Magnesium 2 0 mg/dL     C-reactive protein [[id number]]  (Abnormal) Collected: 06/02/23 1206    Lab Status: Final result Specimen: Blood from Arm, Right Updated: 06/02/23 1237     CRP 13 5 mg/L     CBC and differential [[id number]]  (Abnormal) Collected: 06/02/23 1206    Lab Status: Final result Specimen: Blood from Arm, Right Updated: 06/02/23 1217     WBC 10 37 Thousand/uL      RBC 4 82 Million/uL      Hemoglobin 15 5 g/dL      Hematocrit 45 5 %      MCV 94 fL      MCH 32 2 pg      MCHC 34 1 g/dL      RDW 12 5 %      MPV 9 7 fL      Platelets 519 Thousands/uL      nRBC 0 /100 WBCs      Neutrophils Relative 74 %      Immat GRANS % 0 %      Lymphocytes Relative 13 %      Monocytes Relative 11 %      Eosinophils Relative 2 %      Basophils Relative 0 %      Neutrophils Absolute 7 59 Thousands/µL      Immature Grans Absolute 0 04 Thousand/uL      Lymphocytes Absolute 1 30 Thousands/µL      Monocytes Absolute 1 17 Thousand/µL      Eosinophils Absolute 0 25 Thousand/µL      Basophils Absolute 0 02 Thousands/µL     Blood culture #2 [[id number]] Collected: 06/02/23 1206    Lab Status:  In process Specimen: Blood from Arm, Right Updated: 06/02/23 1214    Blood culture #1 [[id number]] Collected: 06/02/23 1206    Lab Status: In process Specimen: Blood from Hand, Right Updated: 06/02/23 1214                 XR foot 3+ views LEFT   ED Interpretation by Jackee Shone, DO (06/02 1259)   No fracture or dislocation appreciated  No osseous erosion or destruction noted  Procedures  Procedures         ED Course                                             Medical Decision Making  Well-appearing 40-year-old male who presents for left foot pain  Exam is consistent with cellulitis of the left foot  No penetration history, no rapid spread of area of redness no fevers, no crepitus  Suspicion for necrotizing fasciitis of the foot is low  Patient started on antibiotics in the ED  We will continue as outpatient with pain management  Reviewed patient's need to keep his feet dry, if he has to wear his crocs which do not have holes to allow air exchange he needs to take them off at the end of the day, take his feet out take his socks off and allow them to dry  Given follow-up with PCP, where need to establish with a podiatrist   Medical records were reviewed and no history of follow-up and return precautions were reviewed  Patient verbalized understanding agreement plan  Amount and/or Complexity of Data Reviewed  Labs: ordered  Radiology: ordered and independent interpretation performed  Risk  Prescription drug management  Disposition  Final diagnoses:   Cellulitis of foot     Time reflects when diagnosis was documented in both MDM as applicable and the Disposition within this note     Time User Action Codes Description Comment    6/2/2023  1:52 PM Laurence Hamman Add [L03 [id number]] Cellulitis of foot       ED Disposition     ED Disposition   Discharge    Condition   Stable    Date/Time   Fri Jun 2, 2023  1:51 PM    Comment   Ivette Maldonado discharge to home/self care                 Follow-up Information    None         Patient's Medications   Discharge Prescriptions    CLINDAMYCIN (CLEOCIN) 300 MG CAPSULE Take 1 capsule (300 mg total) by mouth 3 (three) times a day for 7 days       Start Date: 6/2/2023  End Date: 6/9/2023       Order Dose: 300 mg       Quantity: 21 capsule    Refills: 0    OXYCODONE-ACETAMINOPHEN (PERCOCET) 5-325 MG PER TABLET    Take 1 tablet by mouth every 4 (four) hours as needed for moderate pain for up to 3 days Max Daily Amount: 6 tablets       Start Date: 6/2/2023  End Date: 6/5/2023       Order Dose: 1 tablet       Quantity: 12 tablet    Refills: 0       No discharge procedures on file      PDMP Review       Value Time User    PDMP Reviewed  Yes 6/2/2023  1:57 PM Geno Mcnamara DO          ED Provider  Electronically Signed by           Geno Mcnamaar DO  06/02/23 1221

## 2023-06-07 LAB
BACTERIA BLD CULT: NORMAL
BACTERIA BLD CULT: NORMAL

## 2023-09-28 ENCOUNTER — HOSPITAL ENCOUNTER (EMERGENCY)
Facility: HOSPITAL | Age: 43
Discharge: HOME | End: 2023-09-28
Attending: INTERNAL MEDICINE

## 2023-09-28 VITALS
SYSTOLIC BLOOD PRESSURE: 108 MMHG | HEART RATE: 82 BPM | RESPIRATION RATE: 16 BRPM | TEMPERATURE: 97.3 F | DIASTOLIC BLOOD PRESSURE: 72 MMHG | OXYGEN SATURATION: 97 %

## 2023-09-28 DIAGNOSIS — F19.90 RECREATIONAL DRUG USE: Primary | ICD-10-CM

## 2023-09-28 PROCEDURE — 99283 EMERGENCY DEPT VISIT LOW MDM: CPT | Performed by: INTERNAL MEDICINE

## 2023-09-28 PROCEDURE — 99282 EMERGENCY DEPT VISIT SF MDM: CPT

## 2023-09-28 NOTE — ED NOTES
Patient alert and oriented. Ambulates throughout ED without any difficulty.       Zheng Powers RN  09/28/23 [id number]

## 2023-09-28 NOTE — ED PROVIDER NOTES
History  Chief Complaint   Patient presents with   • Recreational Drug Use     ARrives via EMS after he was given narcan by bystanders after being passed out. Given more narcan by EMS. HPI  40-year-old man brought to the ER by EMS for evaluation of altered mental status. Per EMS, patient was seen passed out by bystanders and was given Narcan. He was given additional Narcan by EMS. Patient is alert and oriented in the ER, he states he does not know why he was given Narcan and does not want to be here. Patient states he did not use any drugs. Patient reports,, " they picked up the wrong chetan." patient is refusing community Narcan, rehab, detox or outpatient resources. Denies any physical complaints or concerns. Patient states he will be walking out of the ER in a few minutes. Prior to Admission Medications   Prescriptions Last Dose Informant Patient Reported? Taking?   bacitracin topical ointment 500 units/g topical ointment   No No   Sig: Apply 1 large application topically 2 (two) times a day   calamine lotion   No No   Sig: Apply topically as needed for itching   hydrocortisone 1 % cream   No No   Sig: Apply to affected area 2 times daily   naproxen (Naprosyn) 500 mg tablet   No No   Sig: Take 1 tablet (500 mg total) by mouth 2 (two) times a day with meals      Facility-Administered Medications: None       History reviewed. No pertinent past medical history. History reviewed. No pertinent surgical history. History reviewed. No pertinent family history. I have reviewed and agree with the history as documented. E-Cigarette/Vaping   • E-Cigarette Use Never User      E-Cigarette/Vaping Substances     Social History     Tobacco Use   • Smoking status: Every Day     Packs/day: 1.00     Types: Cigarettes   • Smokeless tobacco: Never   • Tobacco comments:     socially   Vaping Use   • Vaping Use: Never used   Substance Use Topics   • Alcohol use:  Yes     Alcohol/week: 21.0 standard drinks of alcohol Types: 21 Cans of beer per week     Comment: occasionally    • Drug use: Yes     Types: Marijuana, Cocaine, "Crack" cocaine     Comment: K2       Review of Systems   All other systems reviewed and are negative. Physical Exam  Physical Exam   PHYSICAL EXAM    Constitutional:  Well developed, no acute distress  HEENT:  Conjunctiva normal. Oropharynx moist  Respiratory:  No respiratory distress  Cardiovascular:  Normal rate  GI:  Soft, nondistended, nontender  :  No day  Musculoskeletal:  No edema, no tenderness, no deformities  Integument:  Well hydrated, no rash   Lymphatic:  No lymphadenopathy noted   Neurologic:  Alert & oriented x 3, normal motor function, no focal deficits noted   Psychiatric: Agitated        Vital Signs  ED Triage Vitals [09/28/23 1751]   Temperature Pulse Respirations Blood Pressure SpO2   (!) 97.3 °F (36.3 °C) 82 16 108/72 97 %      Temp Source Heart Rate Source Patient Position - Orthostatic VS BP Location FiO2 (%)   Tympanic Monitor Sitting Left arm --      Pain Score       --           Vitals:    09/28/23 1751   BP: 108/72   Pulse: 82   Patient Position - Orthostatic VS: Sitting         Visual Acuity      ED Medications  Medications - No data to display    Diagnostic Studies  Results Reviewed     None                 No orders to display              Procedures  Procedures         ED Course                                             Medical Decision Making  43-year-old man brought to the ER by EMS for evaluation of altered mental status. Per EMS, patient was seen passed out by bystanders and was given Narcan and given Narcan by EMS. Patient denies drug use. He is alert and oriented at this time. Advised him to stay for observation, he adamantly refuses. He is not willing to wait any longer and walked out of the ER.     Recreational drug use: acute illness or injury      Disposition  Final diagnoses:   Recreational drug use     Time reflects when diagnosis was documented in both MDM as applicable and the Disposition within this note     Time User Action Codes Description Comment    9/28/2023  5:56 PM Cameron Mejias Add [F19.90] Recreational drug use       ED Disposition     ED Disposition   Left from Room after Provider Exam    Condition   --    Date/Time   Thu Sep 28, 2023  5:56 PM    Comment   --         Follow-up Information    None         Discharge Medication List as of 9/28/2023  6:18 PM      CONTINUE these medications which have NOT CHANGED    Details   bacitracin topical ointment 500 units/g topical ointment Apply 1 large application topically 2 (two) times a day, Starting Mon 12/7/2020, Print      calamine lotion Apply topically as needed for itching, Starting Tue 2/8/2022, Print      hydrocortisone 1 % cream Apply to affected area 2 times daily, Print      naproxen (Naprosyn) 500 mg tablet Take 1 tablet (500 mg total) by mouth 2 (two) times a day with meals, Starting Mon 3/7/2022, Normal             No discharge procedures on file.     PDMP Review       Value Time User    PDMP Reviewed  Yes 6/2/2023  1:57 PM Ania Fung DO          ED Provider  Electronically Signed by           Cameron Mejias MD  09/28/23 2005

## 2023-09-29 NOTE — ED CARE HANDOFF
[facility] Warm Handoff Outcome Note    Patient name Adair Tanner  Location Z1H3/Z1H3 MRN [id number]  Age: 38 y.o. Plan Type: Warm Handoff                                                                                    Plan Date: 9/28/2023  Service:  ED Warm Handoff      Substance Use History:  Heroin/Fentanyl    Warm Handoff Update:  Patient discharged, HOST to follow up.     Warm Handoff Outcome: Treatment Related Resources

## 2024-03-26 ENCOUNTER — HOSPITAL ENCOUNTER (EMERGENCY)
Facility: HOSPITAL | Age: 44
Discharge: HOME | End: 2024-03-27
Attending: EMERGENCY MEDICINE
Payer: COMMERCIAL

## 2024-03-26 DIAGNOSIS — F19.90 RECREATIONAL DRUG USE: ICD-10-CM

## 2024-03-26 DIAGNOSIS — F19.10 SUBSTANCE ABUSE (HCC): Primary | ICD-10-CM

## 2024-03-26 PROCEDURE — 99284 EMERGENCY DEPT VISIT MOD MDM: CPT

## 2024-03-26 PROCEDURE — 99283 EMERGENCY DEPT VISIT LOW MDM: CPT | Performed by: EMERGENCY MEDICINE

## 2024-03-27 VITALS
HEART RATE: 79 BPM | RESPIRATION RATE: 20 BRPM | WEIGHT: 154.32 LBS | DIASTOLIC BLOOD PRESSURE: 72 MMHG | TEMPERATURE: 97.9 F | OXYGEN SATURATION: 95 % | SYSTOLIC BLOOD PRESSURE: 123 MMHG

## 2024-03-27 NOTE — ED NOTES
Patient noted to be sleeping with easy non labored respirations. Some position changes noted.     Tereza Chiu RN  03/27/24 [id number]

## 2024-03-27 NOTE — ED NOTES
Pt ,vitals taken and given orange juice, p.b. crackers and animal crackers. Tolerating well.     Blas Horn  03/27/24 0546

## 2024-03-27 NOTE — ED NOTES
Patient awake and alert. Eating snack food. Offering no complaints.     Tereza Chiu, KISHA  03/27/24 0577

## 2024-03-27 NOTE — ED NOTES
Patient on continuous pulse oximetry monitoring and BP measuring. Patient sleeping when not disturbed with easy non labored respirations.     Tereza Chiu RN  03/27/24 [id number]

## 2024-03-27 NOTE — ED PROVIDER NOTES
"History  Chief Complaint   Patient presents with    Recreational Drug Use     Arrives via EMS after allegedly using \"K5\" Upon arrival patient reports he uses \"everything\" and states he used a little heroin today. Only snorts, no injection per patient. Pupils dilated and patient responsive and interactive with no distress. Report he \"feels fine.\"     43-year-old gentleman presents after reported drug abuse.  Patient states that he used K5 and \"everything else\".  The patient is laughing hysterically as he states that his birthday is on [birth year] and he is asking for a [employer] and a [employer] tree.  He has no acute pain complaints or other issues at this time.  Denies use of alcohol.      Drug Problem  Severity:  Moderate  Onset quality:  Gradual  Duration: hours.  Timing:  Constant  Progression:  Waxing and waning  Chronicity:  Recurrent  Relieved by:  Not using drugs  Worsened by:  Using drugs      Prior to Admission Medications   Prescriptions Last Dose Informant Patient Reported? Taking?   bacitracin topical ointment 500 units/g topical ointment Not Taking  No No   Sig: Apply 1 large application topically 2 (two) times a day   Patient not taking: Reported on 3/27/2024   calamine lotion Not Taking  No No   Sig: Apply topically as needed for itching   Patient not taking: Reported on 3/27/2024   hydrocortisone 1 % cream Not Taking  No No   Sig: Apply to affected area 2 times daily   Patient not taking: Reported on 3/27/2024   naproxen (Naprosyn) 500 mg tablet Not Taking  No No   Sig: Take 1 tablet (500 mg total) by mouth 2 (two) times a day with meals   Patient not taking: Reported on 3/27/2024      Facility-Administered Medications: None       History reviewed. No pertinent past medical history.    History reviewed. No pertinent surgical history.    History reviewed. No pertinent family history.  I have reviewed and agree with the history as documented.    E-Cigarette/Vaping    E-Cigarette Use Never User  " "    E-Cigarette/Vaping Substances     Social History     Tobacco Use    Smoking status: Every Day     Current packs/day: 1.00     Types: Cigarettes    Smokeless tobacco: Never    Tobacco comments:     socially   Vaping Use    Vaping status: Never Used   Substance Use Topics    Alcohol use: Yes     Alcohol/week: 21.0 standard drinks of alcohol     Types: 21 Cans of beer per week     Comment: occasionally     Drug use: Yes     Types: Marijuana, Cocaine, \"Crack\" cocaine, Heroin     Comment: K2       Review of Systems   All other systems reviewed and are negative.      Physical Exam  Physical Exam  Vitals and nursing note reviewed.   Constitutional:       General: He is not in acute distress.     Appearance: Normal appearance. He is well-developed. He is not ill-appearing, toxic-appearing or diaphoretic.   HENT:      Head: Normocephalic and atraumatic.      Right Ear: External ear normal.      Left Ear: External ear normal.      Nose: Nose normal.      Mouth/Throat:      Mouth: Mucous membranes are moist.      Pharynx: Oropharynx is clear.   Eyes:      Conjunctiva/sclera: Conjunctivae normal.      Pupils: Pupils are equal, round, and reactive to light.   Cardiovascular:      Rate and Rhythm: Normal rate and regular rhythm.      Heart sounds: Normal heart sounds.   Pulmonary:      Effort: Pulmonary effort is normal. No respiratory distress.      Breath sounds: Normal breath sounds.   Abdominal:      General: Bowel sounds are normal. There is no distension.      Palpations: Abdomen is soft.      Tenderness: There is no abdominal tenderness. There is no guarding.   Musculoskeletal:         General: Normal range of motion.      Cervical back: Neck supple. No rigidity.      Right lower leg: No edema.      Left lower leg: No edema.   Skin:     General: Skin is warm and dry.      Capillary Refill: Capillary refill takes less than 2 seconds.   Neurological:      General: No focal deficit present.      Mental Status: He is alert " and oriented to person, place, and time.   Psychiatric:         Mood and Affect: Mood is elated.         Speech: Speech normal.         Behavior: Behavior is cooperative.         Thought Content: Thought content normal.         Vital Signs  ED Triage Vitals   Temperature Pulse Respirations Blood Pressure SpO2   03/26/24 2350 03/26/24 2348 03/26/24 2348 03/26/24 2348 03/26/24 2348   97.9 °F (36.6 °C) (!) 124 19 145/93 96 %      Temp Source Heart Rate Source Patient Position - Orthostatic VS BP Location FiO2 (%)   03/26/24 2350 03/26/24 2348 03/26/24 2348 03/26/24 2348 --   Tympanic Monitor Lying Right arm       Pain Score       --                  Vitals:    03/27/24 0159 03/27/24 0223 03/27/24 0254 03/27/24 0539   BP: 101/58 98/60 103/69 123/72   Pulse: 82 85 83 79   Patient Position - Orthostatic VS: Lying Lying Lying Lying         Visual Acuity  Visual Acuity      Flowsheet Row Most Recent Value   L Pupil Size (mm) 4   R Pupil Size (mm) 4            ED Medications  Medications - No data to display    Diagnostic Studies  Results Reviewed       None                   No orders to display              Procedures  Procedures         ED Course                               SBIRT 20yo+      Flowsheet Row Most Recent Value   Initial Alcohol Screen: US AUDIT-C     1. How often do you have a drink containing alcohol? 0 Filed at: 03/26/2024 2350   2. How many drinks containing alcohol do you have on a typical day you are drinking?  0 Filed at: 03/26/2024 2350   3a. Male UNDER 65: How often do you have five or more drinks on one occasion? 0 Filed at: 03/26/2024 2350   Audit-C Score 0 Filed at: 03/26/2024 2350   SHIMON: How many times in the past year have you...    Used an illegal drug or used a prescription medication for non-medical reasons? Daily or Almost Daily Filed at: 03/26/2024 2350   DAST-10: In the past 12 months...    1. Have you used drugs other than those required for medical reasons? 1 Filed at: 03/26/2024 2350  "  2. Do you use more than one drug at a time? 1 Filed at: 03/26/2024 2350   3. Have you had medical problems as a result of your drug use (e.g., memory loss, hepatitis, convulsions, bleeding, etc.)? 1 Filed at: 03/26/2024 2350   4. Have you had \"blackouts\" or \"flashbacks\" as a result of drug use?YesNo 1 Filed at: 03/26/2024 2350   5. Do you ever feel bad or guilty about your drug use? 0 Filed at: 03/26/2024 2350   6. Does your spouse (or parent) ever complain about your involvement with drugs? 1 Filed at: 03/26/2024 2350   7. Have you neglected your family because of your use of drugs? 1 Filed at: 03/26/2024 2350   8. Have you engaged in illegal activities in order to obtain drugs? 1 Filed at: 03/26/2024 2350   9. Have you ever experienced withdrawal symptoms (felt sick) when you stopped taking drugs? 1 Filed at: 03/26/2024 2350   10. Are you always able to stop using drugs when you want to? 1 Filed at: 03/26/2024 2350   DAST-10 Score 9 Filed at: 03/26/2024 2350                      Medical Decision Making  43-year-old gentleman presents after using drugs.  He reports using K5 and was somewhat altered.  He offers no acute complaints on arrival and was monitored for a period of time.  Continue to offer no complaints and did not require admission and did not request any form of detox or rehab.  Plan to discharge home at this time.  Patient has been advised against the use of illicit substances.             Disposition  Final diagnoses:   Substance abuse (HCC)   Recreational drug use     Time reflects when diagnosis was documented in both MDM as applicable and the Disposition within this note       Time User Action Codes Description Comment    3/27/2024 12:27 AM Jj Dos Santos Add [F19.10] Substance abuse (HCC)     3/27/2024 12:27 AM Jj Dos Santos [F19.90] Recreational drug use           ED Disposition       ED Disposition   Discharge    Condition   Stable    Date/Time   Wed Mar 27, 2024 0514    Comment   Cody " Ajck discharge to home/self care.                   Follow-up Information    None         Discharge Medication List as of 3/27/2024  5:14 AM        CONTINUE these medications which have NOT CHANGED    Details   bacitracin topical ointment 500 units/g topical ointment Apply 1 large application topically 2 (two) times a day, Starting Mon 12/7/2020, Print      calamine lotion Apply topically as needed for itching, Starting Tue 2/8/2022, Print      hydrocortisone 1 % cream Apply to affected area 2 times daily, Print      naproxen (Naprosyn) 500 mg tablet Take 1 tablet (500 mg total) by mouth 2 (two) times a day with meals, Starting Mon 3/7/2022, Normal             No discharge procedures on file.    PDMP Review         Value Time User    PDMP Reviewed  Yes 6/2/2023  1:57 PM Rogelio Thornton DO            ED Provider  Electronically Signed by             Jj Dos Santos DO  03/27/24 [id number]